# Patient Record
Sex: FEMALE | Race: WHITE
[De-identification: names, ages, dates, MRNs, and addresses within clinical notes are randomized per-mention and may not be internally consistent; named-entity substitution may affect disease eponyms.]

---

## 2018-12-03 ENCOUNTER — HOSPITAL ENCOUNTER (OUTPATIENT)
Dept: HOSPITAL 25 - OR | Age: 28
Discharge: HOME | End: 2018-12-03
Attending: ORTHOPAEDIC SURGERY
Payer: COMMERCIAL

## 2018-12-03 VITALS — DIASTOLIC BLOOD PRESSURE: 61 MMHG | SYSTOLIC BLOOD PRESSURE: 114 MMHG

## 2018-12-03 DIAGNOSIS — J45.990: ICD-10-CM

## 2018-12-03 DIAGNOSIS — R00.2: ICD-10-CM

## 2018-12-03 DIAGNOSIS — M84.375A: Primary | ICD-10-CM

## 2018-12-03 PROCEDURE — 88304 TISSUE EXAM BY PATHOLOGIST: CPT

## 2018-12-03 PROCEDURE — C1713 ANCHOR/SCREW BN/BN,TIS/BN: HCPCS

## 2018-12-03 PROCEDURE — 88311 DECALCIFY TISSUE: CPT

## 2018-12-03 PROCEDURE — 81025 URINE PREGNANCY TEST: CPT

## 2018-12-04 NOTE — OP
DATE OF OPERATION:  12/03/18 - [facility]

 

DATE OF BIRTH:  [birth year]

 

ATTENDING SURGEON:  Oseas Guerra MD

 

ASSISTANT:  Lola Melvin PA-C.

 

PRE-OP DIAGNOSIS:  Chronic stress changes, left third metatarsal.

 

POST-OP DIAGNOSIS:  Chronic stress changes, left third metatarsal.

 

OPERATIVE PROCEDURE:  Left shortening osteotomy Weil, and proximal bone biopsy.

 

DESCRIPTION OF PROCEDURE:  The patient was taken to the operating room where we 
used the previous longitudinal incision over the dorsum of the left foot.  The 
third MTP joint was exposed by retracting the extensor tendons.  A 
microsagittal saw was used to divide the metatarsal neck area in dorsal distal 
proximal plantar orientation.  A 2-mm wedge of bone was removed after double 
cutting and then sliding the metatarsal head proximally.  This was fixed with a 
12-mm twist-off screw.  Good fixation and alignment was obtained.  Proximally 
at the third metatarsal shaft, we made a small oval corticotomy dorsally with 
the 2-mm power jas and then used a small curette to harvest cancellous bone, 
this was sent to pathology.  The wound was then irrigated thoroughly closing 
with 3-0 Vicryl and 3-0 Monocryl subcu.  We placed Steri-Strips and a 
compression dressing applied.

 

 [id number]/CPS #: [id number]

[facility]